# Patient Record
Sex: MALE | Race: WHITE | NOT HISPANIC OR LATINO | Employment: FULL TIME | URBAN - METROPOLITAN AREA
[De-identification: names, ages, dates, MRNs, and addresses within clinical notes are randomized per-mention and may not be internally consistent; named-entity substitution may affect disease eponyms.]

---

## 2017-06-20 ENCOUNTER — HOSPITAL ENCOUNTER (EMERGENCY)
Facility: HOSPITAL | Age: 42
Discharge: HOME | End: 2017-06-20
Attending: EMERGENCY MEDICINE | Admitting: EMERGENCY MEDICINE

## 2017-06-20 VITALS
HEIGHT: 67 IN | HEART RATE: 86 BPM | RESPIRATION RATE: 16 BRPM | WEIGHT: 140 LBS | SYSTOLIC BLOOD PRESSURE: 135 MMHG | BODY MASS INDEX: 21.97 KG/M2 | TEMPERATURE: 98 F | DIASTOLIC BLOOD PRESSURE: 85 MMHG | OXYGEN SATURATION: 96 %

## 2017-06-20 DIAGNOSIS — T40.1X1A HEROIN OVERDOSE, ACCIDENTAL OR UNINTENTIONAL, INITIAL ENCOUNTER (HCC): Primary | ICD-10-CM

## 2017-06-20 LAB
ETHANOL BLD-MCNC: <10 MG/DL (ref 0–10)
ETHANOL UR QL: <0.01 %

## 2017-06-20 PROCEDURE — 96374 THER/PROPH/DIAG INJ IV PUSH: CPT

## 2017-06-20 PROCEDURE — 25010000002 NALOXONE PER 1 MG: Performed by: EMERGENCY MEDICINE

## 2017-06-20 PROCEDURE — 90791 PSYCH DIAGNOSTIC EVALUATION: CPT

## 2017-06-20 PROCEDURE — 99284 EMERGENCY DEPT VISIT MOD MDM: CPT

## 2017-06-20 PROCEDURE — 80307 DRUG TEST PRSMV CHEM ANLYZR: CPT | Performed by: EMERGENCY MEDICINE

## 2017-06-20 RX ORDER — GABAPENTIN 800 MG/1
800 TABLET ORAL 3 TIMES DAILY
COMMUNITY

## 2017-06-20 RX ORDER — CHLORPROMAZINE HYDROCHLORIDE 50 MG/1
50 TABLET, FILM COATED ORAL 3 TIMES DAILY
COMMUNITY

## 2017-06-20 RX ORDER — NALOXONE HCL 0.4 MG/ML
2 VIAL (ML) INJECTION ONCE
Status: COMPLETED | OUTPATIENT
Start: 2017-06-20 | End: 2017-06-20

## 2017-06-20 RX ORDER — BUSPIRONE HYDROCHLORIDE 10 MG/1
10 TABLET ORAL 3 TIMES DAILY
COMMUNITY

## 2017-06-20 RX ADMIN — NALOXONE HYDROCHLORIDE 2 MG: 0.4 INJECTION, SOLUTION INTRAMUSCULAR; INTRAVENOUS; SUBCUTANEOUS at 16:30

## 2017-06-20 NOTE — ED PROVIDER NOTES
" EMERGENCY DEPARTMENT ENCOUNTER    CHIEF COMPLAINT  Chief Complaint: LOC; possible overdose  History given by: Pt's friend  History limited by: Unresponsive  Room Number: 28/28  PMD: KELLY Epps      HPI:  Pt is a 42 y.o. male who presents unresponsive. Per friend, she was driving the patient from his house to her house. He stated he was tired after working a night shift last night and fell asleep in the back seat. Friend noticed he began drooling, the patient became unresponsive and his lips turned \"blue.\" She reports he was a previous drug user but stated he was clean. She states he has said he used \"everything.\"      Duration:  unknown  Onset: gradual  Timing: constant  Location: n/a  Radiation: n/a  Quality: unable to respond  Intensity/Severity: moderate  Progression: improving after Narcan  Associated Symptoms: none  Aggravating Factors: none  Alleviating Factors: none  Previous Episodes: H/o drug abuse  Treatment before arrival: none    PAST MEDICAL HISTORY  Active Ambulatory Problems     Diagnosis Date Noted   • No Active Ambulatory Problems     Resolved Ambulatory Problems     Diagnosis Date Noted   • No Resolved Ambulatory Problems     Past Medical History:   Diagnosis Date   • Anxiety    • Depression    • Substance abuse        PAST SURGICAL HISTORY  History reviewed. No pertinent surgical history.    FAMILY HISTORY  History reviewed. No pertinent family history.    SOCIAL HISTORY  Social History     Social History   • Marital status: Single     Spouse name: N/A   • Number of children: N/A   • Years of education: N/A     Occupational History   • Not on file.     Social History Main Topics   • Smoking status: Current Every Day Smoker     Packs/day: 1.00     Types: Cigarettes   • Smokeless tobacco: Not on file   • Alcohol use Yes      Comment: occasional    • Drug use: Yes     Special: IV, Heroin      Comment: Pt now admits to heroin use.    • Sexual activity: Defer     Other Topics Concern   • " Not on file     Social History Narrative   • No narrative on file       ALLERGIES  Review of patient's allergies indicates no known allergies.    REVIEW OF SYSTEMS  Review of Systems   Unable to perform ROS: Patient unresponsive       PHYSICAL EXAM  ED Triage Vitals   Temp Heart Rate Resp BP SpO2   -- 06/20/17 1628 -- -- 06/20/17 1628    98   75 %      Temp src Heart Rate Source Patient Position BP Location FiO2 (%)   -- -- -- -- --              Physical Exam   Constitutional: He is oriented to person, place, and time.   Pt was initially unresponsive and cyanotic with minimal response to painful stimuli. After narcan, he woke up. Admitted to heroin use   HENT:   Head: Normocephalic and atraumatic.   Eyes: EOM are normal. Pupils are equal, round, and reactive to light.   Neck: Normal range of motion. Neck supple.   Cardiovascular: Normal rate, regular rhythm and normal heart sounds.    Pulmonary/Chest: Breath sounds normal. No respiratory distress.   Initially, patient had minimal respiratory effort and sat 75%. After narcan, his O2 sat was 100% and respiratory effort was normal.   Abdominal: Soft. There is no tenderness. There is no rebound and no guarding.   Musculoskeletal: Normal range of motion. He exhibits no edema.   Neurological: He is alert and oriented to person, place, and time. He has normal sensation and normal strength.   Skin: Skin is warm and dry.   Psychiatric: Mood and affect normal.   Nursing note and vitals reviewed.      LAB RESULTS  Lab Results (last 24 hours)     Procedure Component Value Units Date/Time    Ethanol [[id number]] Collected:  06/20/17 1641    Specimen:  Blood from Arm, Left Updated:  06/20/17 1708     Ethanol <10 mg/dL      Ethanol % <0.010 %           I ordered the above labs and reviewed the results      PROCEDURES  Procedures      PROGRESS AND CONSULTS  ED Course     1630  Ordered Narcan.     1632  Ordered EtOH and UDS for further evaluation.     1740  Rechecked patient. He  "states he feels \"fine.\" O2 sat 99% on room air.     1741  Nurse placed a call to [facility] for evaluation.     1758  Pt has been observed for 1.5 hours without recurrent signs of drug toxicity.   Family has requested [facility] eval.  Patient turned over to Dr. Farah pending [facility] eval.      MEDICAL DECISION MAKING  Results were reviewed/discussed with the patient and they were also made aware of online access. Pt also made aware that some labs, such as cultures, will not be resulted during ER visit and follow up with PMD is necessary.     MDM  Number of Diagnoses or Management Options     Amount and/or Complexity of Data Reviewed  Clinical lab tests: reviewed and ordered (EtOH<10)  Decide to obtain previous medical records or to obtain history from someone other than the patient: yes  Obtain history from someone other than the patient: yes (Patient's friend)           DIAGNOSIS  Final diagnoses:   Heroin overdose, accidental or unintentional, initial encounter       DISPOSITION  Discharge after [facility] evaluation and referral for treatment.     Patient discharged in stable condition.    Reviewed implications of results, diagnosis, meds, responsibility to follow up, warning signs and symptoms of possible worsening, potential complications and reasons to return to ER.    Patient/Family voiced understanding of above instructions.    Discussed plan for discharge, as there is no emergent indication for admission.  Pt/family is agreeable and understands need for follow up and repeat testing.  Pt is aware that discharge does not mean that nothing is wrong but it indicates no emergency is present that requires admission and they must continue care with follow-up as given below or physician of their choice.     FOLLOW-UP  Yani Feliciano, APRN  [address] [facility] [address] [phone]               Medication List      Notice     No changes were made to your prescriptions during this visit.            Latest " Documented Vital Signs:  As of 4:14 PM  BP- 135/85 HR- 86 Temp- 98 °F (36.7 °C) (Oral) O2 sat- 96%    --  Documentation assistance provided by chong Fernandez for Dr. Paris.  Information recorded by the scribe was done at my direction and has been verified and validated by me.     Marily Fernandez  06/20/17 [id number]       Handy Paris MD  06/21/17 [id number]

## 2017-06-20 NOTE — ED NOTES
Security is in possession of patient's keys and cigarettes. Pt still has his wallet, but it has been searched.     Rachael Todd RN Extern  06/20/17 [id number]

## 2017-06-20 NOTE — ED PROVIDER NOTES
Care turned over by Dr. Paris at 18:00 pending [facility] evaluation. Pt presented to the ED after becoming unresponsive from suspected drug overdose.     7:45 PM  Pt was evaluated by [facility] and given multiple outpatient resources to follow up with. Pt was medically cleared by Dr. Paris before [facility] evaluation and does no require re-evaluation. Pt will be discharged.     Documentation assistance provided by chong Aponte for Dr. Farah.  Information recorded by the scribe was done at my direction and has been verified and validated by me.       America Aponte  06/20/17 3369       Farrukh Farah MD  06/20/17 0570

## 2017-06-20 NOTE — CONSULTS
A 43 yo white male seen in ED after overdosing on heroin. Pt was passenger in friends car and she realized that he was not breathing. Friend brought pt to ED and he was given narcan and revived. Pt started using THC at age 13, pain pills started at age 20 and 1 1/2 yrs ago started using heroin. Pt snorts heroin. Pt recently went 3 weeks of treatment for heroin use at [facility] with discharge the day after Memorial Day. Pt states he relapsed a week after discharge and then quit using and started attending NA mtgs. Pt states he has extreme anxiety, racing thoughts, sleeps very little. Pt does use THC daily if can afford because it levels him out, also helps with his appetite. Pt states he has a lot of ups and downs. 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 States he hurts all over. Pt denies SI and HI. Family very concerned about pt's relapse. Discussed treatment options with patient and family. Pt very interested in referrals to psychiatrist or psychiatric ARNP. Pt given referrals for psychiatrist and psychiatric ARNP and outpt CD options.  Discussed with Dr. Farah and he is in agreement with d/c plan.

## 2021-05-07 ENCOUNTER — EMERGENCY ROOM – HOSPITAL (OUTPATIENT)
Dept: URBAN - METROPOLITAN AREA HOSPITAL 106 | Facility: HOSPITAL | Age: 46
End: 2021-05-07
Payer: COMMERCIAL

## 2021-05-07 DIAGNOSIS — K21.00 GASTRO-ESOPHAGEAL REFLUX DISEASE WITH ESOPHAGITIS, WITHOUT B: ICD-10-CM

## 2021-05-07 DIAGNOSIS — T18.128A FOOD IN ESOPHAGUS CAUSING OTHER INJURY, INITIAL ENCOUNTER: ICD-10-CM

## 2021-05-07 PROCEDURE — 43239 EGD BIOPSY SINGLE/MULTIPLE: CPT | Performed by: INTERNAL MEDICINE
